# Patient Record
Sex: FEMALE | Race: BLACK OR AFRICAN AMERICAN | HISPANIC OR LATINO | Employment: PART TIME | URBAN - METROPOLITAN AREA
[De-identification: names, ages, dates, MRNs, and addresses within clinical notes are randomized per-mention and may not be internally consistent; named-entity substitution may affect disease eponyms.]

---

## 2024-07-16 ENCOUNTER — OFFICE VISIT (OUTPATIENT)
Dept: OBSTETRICS AND GYNECOLOGY | Facility: CLINIC | Age: 29
End: 2024-07-16
Payer: MEDICAID

## 2024-07-16 VITALS
WEIGHT: 141.6 LBS | BODY MASS INDEX: 27.8 KG/M2 | DIASTOLIC BLOOD PRESSURE: 75 MMHG | SYSTOLIC BLOOD PRESSURE: 109 MMHG | HEIGHT: 60 IN

## 2024-07-16 DIAGNOSIS — Z13.29 SCREENING FOR THYROID DISORDER: ICD-10-CM

## 2024-07-16 DIAGNOSIS — N89.8 VAGINAL DISCHARGE: ICD-10-CM

## 2024-07-16 DIAGNOSIS — Z13.1 SCREENING FOR DIABETES MELLITUS: ICD-10-CM

## 2024-07-16 DIAGNOSIS — Z01.411 ENCOUNTER FOR GYNECOLOGICAL EXAMINATION WITH ABNORMAL FINDING: Primary | ICD-10-CM

## 2024-07-16 DIAGNOSIS — Z11.3 SCREEN FOR SEXUALLY TRANSMITTED DISEASES: ICD-10-CM

## 2024-07-16 DIAGNOSIS — N92.6 ABNORMAL MENSES: ICD-10-CM

## 2024-07-16 DIAGNOSIS — Z12.4 SCREENING FOR MALIGNANT NEOPLASM OF CERVIX: ICD-10-CM

## 2024-07-16 NOTE — PROGRESS NOTES
Chief Complaint  Gynecologic Exam (New gyn , annual exam. Irregular periods )    Entirety of today's encounter including patient interview, exam, and counseling performed with the aid of a medical  via the telephone.     Subjective        Jessica Solano presents to [facility]  History of Present Illness    Patient is here for annual exam and also having concerns about irregular menstrual cycles.  She reports menses are unpredictable.  She can go a few months without having a menstrual cycle.  She then may have prolonged bleeding.  She is interested in trying to conceive in the near future.  She is having concerns with a vaginal discharge.    History reviewed. No pertinent past medical history.    History reviewed. No pertinent surgical history.    Social History     Tobacco Use    Smoking status: Never   Substance Use Topics    Alcohol use: Not Currently    Drug use: Never     Family History   Problem Relation Age of Onset    Breast cancer Neg Hx     Ovarian cancer Neg Hx     Uterine cancer Neg Hx     Colon cancer Neg Hx      No current outpatient medications on file prior to visit.     No current facility-administered medications on file prior to visit.     No Known Allergies    Review of systems:  Constitutional: No fevers, chills, sweats   Eye: No recent visual problems, denies blurry vision   HEENT: No ear pain, nasal congestion, sore throat, voice changes   Respiratory: No shortness of breath, cough, pain on breathing   Cardiovascular: No Chest pain, palpitations   Gastrointestinal: No nausea, vomiting, diarrhea, constipation   Genitourinary: No hematuria, dysuria, lesions on genitalia   Hema/Lymph: Negative for bruising, no edema   Endocrine: Negative for excessive thirst, excessive hunger, heat or cold intolerance   Musculoskeletal: No joint pain, muscle pain, decreased range of motion   Integumentary: No rash, pruritus, abrasions, lesions   Neurologic: No  "weakness, numbness, headaches   Psychiatric: No anxiety, depression, mood changes       Objective   Vital Signs:  /75   Ht 153 cm (60.24\")   Wt 64.2 kg (141 lb 9.6 oz)   BMI 27.44 kg/m²   Estimated body mass index is 27.44 kg/m² as calculated from the following:    Height as of this encounter: 153 cm (60.24\").    Weight as of this encounter: 64.2 kg (141 lb 9.6 oz).             Physical Exam   Exam performed in the presence of a female chaperone  Patient has provided verbal consent to proceed with exam.    Gen: No acute distress, awake and oriented times three  HENT: Normocephalic, atraumatic, Moist mucous membranes  Eyes: PERRLA, EOMI  Lungs: Normal work of breathing, lungs clear bilaterally  Breast: Symmetrical. No skin changes or nipple retractions. No lumps or masses bilaterally. No tenderness bilaterally.  Abdomen: soft, nontender, no masses or hernia, non distended, normoactive bowel sounds  Normal external female genitalia, no lesions  Urethra: Normal meatus, no caruncle  Bladder: nontender  Vagina: No blood or discharge  Cervix: No cervical motion tenderness, no lesions, no active bleeding, nonfriable  Uterus: Anteverted, normal size and shape, nontender  Adnexa: No masses or tenderness  External anal exam: Normal appearance, no lesions or hemorrhoids  Rectal: Deferred  Skin: Warm and dry, no rashes  Psych: Good judgement and insight, normal affect and mood  Neuro: CN 2-12 intact, no gross deficits    Result Review :                     Assessment and Plan     Diagnoses and all orders for this visit:    1. Encounter for gynecological examination with abnormal finding (Primary)    Pap - Ordered today.  STD screening - Cultures performed today.   Contraception - Options discussed with pt at length. Risks, benefits, side effects, and alternatives to various forms of hormonal, nonhormonal and barrier methods discussed. Pt elects declines.   Safe sex practices encouraged with patient.  Breast cancer " screening. Patient encouraged to perform routine self breast exams. Recommend yearly clinical breast exam and mammogram starting at age 40.  Recommend pt take calcium and vitamin D supplementation as well as prenatal vitamin or folic acid if she is attempting to conceive.  Encourage aerobic exercise with at least 30 minutes 5 days/wk.  Avoid excessive alcohol use.  Patient is advised to call the office for results after 1 week if she has not seen or heard the results of any tests ordered or performed today.      2. Screening for malignant neoplasm of cervix  -     IGP,CtNgTv,rfx Apt HPV All    3. Screen for sexually transmitted diseases  -     IGP,CtNgTv,rfx Apt HPV All    4. Vaginal discharge  -     NuSwab BV & Candida - Swab, Vagina  -     NuSwab Vaginitis (VG) - Swab, Vagina    Cultures obtained.  Will treat as indicated based on culture results.    5. Abnormal menses  -     Testosterone  -     Testosterone Free Direct  -     Prolactin  -     Follicle Stimulating Hormone  -     Luteinizing Hormone  -     Antimullerian Hormone (AMH)  -     Hemoglobin A1c  -     TSH Rfx On Abnormal To Free T4  -     CBC (No Diff)  -     HCG, B-subunit, Quantitative  -     US Non-ob Transvaginal; Future    Discussed potential causes of the menstrual irregularities that the patient is experiencing.  Recommend labs and ultrasound as above.  Will see her back after the ultrasound to discuss potential causes and management plan going forward.    6. Screening for diabetes mellitus  -     Hemoglobin A1c    7. Screening for thyroid disorder  -     TSH Rfx On Abnormal To Free T4             Follow Up     Return GYN US and GYn FU 2-5 weeks, for Needs to do blood work today.  Patient was given instructions and counseling regarding her condition or for health maintenance advice. Please see specific information pulled into the AVS if appropriate.

## 2024-07-17 LAB
ERYTHROCYTE [DISTWIDTH] IN BLOOD BY AUTOMATED COUNT: 12.2 % (ref 11.7–15.4)
FSH SERPL-ACNC: 3.1 MIU/ML
HBA1C MFR BLD: 5.5 % (ref 4.8–5.6)
HCG INTACT+B SERPL-ACNC: <1 MIU/ML
HCT VFR BLD AUTO: 36.9 % (ref 34–46.6)
HGB BLD-MCNC: 12 G/DL (ref 11.1–15.9)
LH SERPL-ACNC: 21.2 MIU/ML
MCH RBC QN AUTO: 28.8 PG (ref 26.6–33)
MCHC RBC AUTO-ENTMCNC: 32.5 G/DL (ref 31.5–35.7)
MCV RBC AUTO: 89 FL (ref 79–97)
PLATELET # BLD AUTO: 332 X10E3/UL (ref 150–450)
PROLACTIN SERPL-MCNC: 14 NG/ML (ref 4.8–33.4)
RBC # BLD AUTO: 4.16 X10E6/UL (ref 3.77–5.28)
TESTOST SERPL-MCNC: 33 NG/DL (ref 13–71)
TSH SERPL DL<=0.005 MIU/L-ACNC: 2.21 UIU/ML (ref 0.45–4.5)
WBC # BLD AUTO: 8.6 X10E3/UL (ref 3.4–10.8)

## 2024-07-18 LAB
A VAGINAE DNA VAG QL NAA+PROBE: ABNORMAL SCORE
BVAB2 DNA VAG QL NAA+PROBE: ABNORMAL SCORE
C ALBICANS DNA VAG QL NAA+PROBE: NEGATIVE
C GLABRATA DNA VAG QL NAA+PROBE: NEGATIVE
MEGA1 DNA VAG QL NAA+PROBE: ABNORMAL SCORE
T VAGINALIS DNA VAG QL NAA+PROBE: NEGATIVE
TESTOST FREE SERPL-MCNC: 1.1 PG/ML (ref 0–4.2)

## 2024-07-19 LAB
C TRACH RRNA CVX QL NAA+PROBE: NEGATIVE
CONV .: NORMAL
CYTOLOGIST CVX/VAG CYTO: NORMAL
CYTOLOGY CVX/VAG DOC CYTO: NORMAL
CYTOLOGY CVX/VAG DOC THIN PREP: NORMAL
DX ICD CODE: NORMAL
Lab: NORMAL
MIS SERPL-MCNC: 9.08 NG/ML
N GONORRHOEA RRNA CVX QL NAA+PROBE: NEGATIVE
OTHER STN SPEC: NORMAL
STAT OF ADQ CVX/VAG CYTO-IMP: NORMAL
T VAGINALIS RRNA SPEC QL NAA+PROBE: NEGATIVE

## 2024-07-22 ENCOUNTER — TELEPHONE (OUTPATIENT)
Dept: OBSTETRICS AND GYNECOLOGY | Facility: CLINIC | Age: 29
End: 2024-07-22
Payer: MEDICAID

## 2024-07-22 RX ORDER — METRONIDAZOLE 500 MG/1
500 TABLET ORAL 2 TIMES DAILY
Qty: 14 TABLET | Refills: 0 | Status: SHIPPED | OUTPATIENT
Start: 2024-07-22 | End: 2024-07-29

## 2024-07-22 NOTE — TELEPHONE ENCOUNTER
----- Message from Basilio Gregg sent at 7/22/2024 11:32 AM EDT -----  Let the patient know that her cultures revealed bacterial vaginosis.  I sent in a prescription for metronidazole.  She should avoid alcohol while taking this medication.

## 2024-07-24 ENCOUNTER — PATIENT ROUNDING (BHMG ONLY) (OUTPATIENT)
Dept: OBSTETRICS AND GYNECOLOGY | Facility: CLINIC | Age: 29
End: 2024-07-24
Payer: MEDICAID

## 2024-07-24 ENCOUNTER — PATIENT MESSAGE (OUTPATIENT)
Dept: OBSTETRICS AND GYNECOLOGY | Facility: CLINIC | Age: 29
End: 2024-07-24
Payer: MEDICAID

## 2024-07-24 NOTE — PROGRESS NOTES
My chart message has been sent to the patient for PATIENT ROUNDING with [facility] [place].

## 2024-08-15 ENCOUNTER — TELEPHONE (OUTPATIENT)
Dept: OBSTETRICS AND GYNECOLOGY | Facility: CLINIC | Age: 29
End: 2024-08-15
Payer: MEDICAID

## 2024-09-12 ENCOUNTER — OFFICE VISIT (OUTPATIENT)
Dept: OBSTETRICS AND GYNECOLOGY | Facility: CLINIC | Age: 29
End: 2024-09-12
Payer: MEDICAID

## 2024-09-12 VITALS
SYSTOLIC BLOOD PRESSURE: 120 MMHG | HEART RATE: 70 BPM | WEIGHT: 136 LBS | DIASTOLIC BLOOD PRESSURE: 85 MMHG | BODY MASS INDEX: 26.7 KG/M2 | HEIGHT: 60 IN

## 2024-09-12 DIAGNOSIS — E28.2 PCOS (POLYCYSTIC OVARIAN SYNDROME): ICD-10-CM

## 2024-09-12 DIAGNOSIS — N91.4 SECONDARY OLIGOMENORRHEA: Primary | ICD-10-CM

## 2024-09-12 RX ORDER — MEDROXYPROGESTERONE ACETATE 5 MG
5 TABLET ORAL DAILY
Qty: 7 TABLET | Refills: 0 | Status: SHIPPED | OUTPATIENT
Start: 2024-09-12 | End: 2024-09-19

## 2024-09-12 RX ORDER — NORGESTIMATE AND ETHINYL ESTRADIOL 0.25-0.035
1 KIT ORAL DAILY
Qty: 28 TABLET | Refills: 3 | Status: SHIPPED | OUTPATIENT
Start: 2024-09-12

## 2024-09-12 NOTE — PROGRESS NOTES
"Chief Complaint  Follow-up (Pt here for f/u on US.) here for follow-up of irregular uterine bleeding    Entirety of today's encounter including patient interview, exam, and counseling performed with the aid of a medical  via the telephone.     Subjective        Jessica Solano presents to [facility]  History of Present Illness  Patient is here for follow-up/management of irregular menstrual cycles.  She had lab work performed at the time of the last visit.  Please see the results below.  She had an ultrasound performed today.  Please see results below.    At the time of the last visit, she was having issues with vaginal discharge and was diagnosed with BV.  She was treated with medication at that time.  The discharge has resolved.  She reports that she is doing much better.    Patient's last menstrual period was 08/02/2024 (exact date).    Today we discussed patient's desires for conceiving in the near future.  She says she does not desire to conceive at this time.  She does potentially have desires for pregnancy in the distant future.    Patient reports that she has not been sexually active at all in the last 4 months, and certainly not since the time of her last period.    The following portions of the patient's history were reviewed and updated as appropriate: allergies, current medications, past family history, past medical history, past social history, past surgical history, and problem list.      Objective   Vital Signs:  /85   Pulse 70   Ht 153 cm (60.24\")   Wt 61.7 kg (136 lb)   BMI 26.35 kg/m²   Estimated body mass index is 26.35 kg/m² as calculated from the following:    Height as of this encounter: 153 cm (60.24\").    Weight as of this encounter: 61.7 kg (136 lb).          Physical Exam   General: No acute distress, awake and oriented x3  Psychiatric: good judgment and insight, normal mood  Neurological: cranial nerves II through XII intact, " no deficits    Result Review :             No visits with results within 45 Day(s) from this visit.   Latest known visit with results is:   Office Visit on 07/16/2024   Component Date Value Ref Range Status    Diagnosis 07/16/2024 Comment   Final    NEGATIVE FOR INTRAEPITHELIAL LESION OR MALIGNANCY.    Specimen adequacy: 07/16/2024 Comment   Final    Comment: Satisfactory for evaluation.  Endocervical and/or squamous metaplastic  cells (endocervical component) are present.      Clinician Provided ICD-10: 07/16/2024 Comment   Final    Comment: Z12.4  Z11.3      Performed by: 07/16/2024 Comment   Final    Tanya Bhatt, Cytotechnologist (ASCP)    . 07/16/2024 .   Final    Note: 07/16/2024 Comment   Final    Comment: The Pap smear is a screening test designed to aid in the detection of  premalignant and malignant conditions of the uterine cervix.  It is not a  diagnostic procedure and should not be used as the sole means of detecting  cervical cancer.  Both false-positive and false-negative reports do occur.      Method: 07/16/2024 Comment   Final    Comment: This liquid based ThinPrep(R) pap test was screened with the  use of an image guided system.      Conv .conv 07/16/2024 Comment   Final    Comment: The HPV DNA reflex criteria were not met with this specimen result  therefore, no HPV testing was performed.      Chlamydia, Nuc. Acid Amp 07/16/2024 Negative  Negative Final    Gonococcus by Nucleic Acid Amp 07/16/2024 Negative  Negative Final    Trichomonas vaginosis 07/16/2024 Negative  Negative Final    Testosterone, Total 07/16/2024 33  13 - 71 ng/dL Final    Testosterone, Free 07/16/2024 1.1  0.0 - 4.2 pg/mL Final    Prolactin 07/16/2024 14.0  4.8 - 33.4 ng/mL Final    FSH 07/16/2024 3.1  mIU/mL Final    Comment:                      Adult Female             Range                        Follicular phase      3.5 -  12.5                        Ovulation phase       4.7 -  21.5                        Luteal phase           1.7 -   7.7                        Postmenopausal       25.8 - 134.8      LH 07/16/2024 21.2  mIU/mL Final    Comment:                      Adult Female              Range                        Follicular phase      2.4 -  12.6                        Ovulation phase      14.0 -  95.6                        Luteal phase          1.0 -  11.4                        Postmenopausal        7.7 -  58.5      Anti-Mullerian Hormone (AMH) 07/16/2024 9.08  ng/mL Final    Comment: For assays employing antibodies, the possibility exists for  interference by heterophile antibodies in the samples.1  1.Murali PEDRAZA  Interferences in Immunoassays - still a threat.   Clin. Chem. 2000; 46: 9556-9790.  This test was developed and its performance characteristics  determined by [employer]. It has not been cleared or approved  by the Food and Drug Administration.  Reference Range:  Females 26 - 30y: 1.03 - 11.10  Median 4.20  AMH concentrations of >= 1.06 ng/mL is correlated with a  better response to ovarian stimulation, produced more  retrievable oocytes and higher odds of live birth according  to Jae et al.  Fertility and Sterility. 2010:  94:8614-8364.  The current AMH test method correlates with  the study method with a slope of 0.94.  Females at risk of ovarian hyperstimulation syndrome or  polycystic ovarian syndrome (PCOS) may exhibit elevated  serum AMH concentrations.   AMH levels from PCOS patients  may be 2 to 5 fold higher than age-appropriate reference  interv                           al values.  Granulosa cell tumors of the ovary may secrete AMH along  with other tumor markers.  Elevated AMH is not specific for  malignancy, and the assay should not be used exclusively to  diagnose or exclude an AMH-secreting ovarian tumor.      Hemoglobin A1C 07/16/2024 5.5  4.8 - 5.6 % Final    Comment:          Prediabetes: 5.7 - 6.4           Diabetes: >6.4           Glycemic control for adults with diabetes: <7.0      TSH  07/16/2024 2.210  0.450 - 4.500 uIU/mL Final    WBC 07/16/2024 8.6  3.4 - 10.8 x10E3/uL Final    RBC 07/16/2024 4.16  3.77 - 5.28 x10E6/uL Final    Hemoglobin 07/16/2024 12.0  11.1 - 15.9 g/dL Final    Hematocrit 07/16/2024 36.9  34.0 - 46.6 % Final    MCV 07/16/2024 89  79 - 97 fL Final    MCH 07/16/2024 28.8  26.6 - 33.0 pg Final    MCHC 07/16/2024 32.5  31.5 - 35.7 g/dL Final    RDW 07/16/2024 12.2  11.7 - 15.4 % Final    Platelets 07/16/2024 332  150 - 450 x10E3/uL Final    HCG Quantitative 07/16/2024 <1  mIU/mL Final    Comment:                      Female (Non-pregnant)    0 -     5                              (Postmenopausal)  0 -     8                       Female (Pregnant)                       Weeks of Gestation                               3                6 -    71                               4               10 -   750                               5              217 -  7138                               6              158 - 87853                               7             [id number] -[id number]                               8            [id number] -[id number]                               9            [id number] -[id number]                              10            64815 -687703                              12            [id number] -[id number]                              14            17213 - 06534                              15            36516 - 56158                              16             1770 - 80101                              17             6166 - 09829                              18             7964 - 85877  Roche E                           CLIA methodology      Atopobium Vaginae 07/16/2024 Moderate - 1  Score Final    BVAB 2 07/16/2024 High - 2 (A)  Score Final    Megasphaera 1 07/16/2024 High - 2 (A)  Score Final    Comment: Calculate total score by adding the 3 individual bacterial  vaginosis (BV) marker scores together.  Total score is  interpreted as follows:  Total score 0-1: Indicates the absence of  BV.  Total score   2: Indeterminate for BV. Additional clinical                   data should be evaluated to establish a                   diagnosis.  Total score 3-6: Indicates the presence of BV.      Shraddha Albicans, THEA 07/16/2024 Negative  Negative Final    Shraddha Glabrata, THEA 07/16/2024 Negative  Negative Final    Trichomonas vaginosis 07/16/2024 Negative  Negative Final     Ultrasound today:  Findings:  Uterus measures 8.00 x 4.12 x 3.74 cm, volume 64.544 cm cube  .  Endometrial thickness measures 0.57 cm, and is homogenous appearance without evidence of polyps.  Cervix is normal-appearing.  Fibroid 1: 1.3 x 1.2 cm, appears lateral and intramural  Fibroid 2: 0.9 x 0.7 cm, appears posterior and subserosal  Left ovary: 3.28 x 2.28 x 2.12 cm, volume 8.301 cm cube  Ovary appears polycystic in nature with greater than 10 follicles noted.  There is no adnexal mass identified.    Right ovary: 3.81 x 2.76 x 2.73 cm, volume 15.031 cm cube  Ovary appears polycystic in nature with greater than 10 follicles and volume greater than 10 cm cube.  There is no adnexal mass identified.    There is no free fluid.    Impression:    Polycystic appearance to both ovaries noted.  2 small uterine fibroid seen.  Otherwise normal pelvic ultrasound       Assessment and Plan   Diagnoses and all orders for this visit:    1. Secondary oligomenorrhea (Primary)    2. PCOS (polycystic ovarian syndrome)  -     medroxyPROGESTERone (Provera) 5 MG tablet; Take 1 tablet by mouth Daily for 7 days.  Dispense: 7 tablet; Refill: 0  -     norgestimate-ethinyl estradiol (Sprintec 28) 0.25-35 MG-MCG per tablet; Take 1 tablet by mouth Daily.  Dispense: 28 tablet; Refill: 3      Patient's symptoms most likely secondary to polycystic ovarian syndrome (PCOS). Ultrasound findings today also consistent with PCOS.  Recent labs also consistent with PCOS given elevated LH: FSH ratio and elevated AMH. Discussed syndrome with pt at length. Discussed potential  effects on fertility and pregnancy. Discussed effects on menses including irregular menses and heavy bleeding. Discussed potential long-term health risks such as endometrial hyperplasia/malignancy, risks of type 2 diabetes, and heart disease. The effects of obesity on the disease process were explained, and the benefits of diet/exercise/weight loss discussed.   Discussed therapeutic options to help with irregular bleeding. Discussed use of hormonal medication for menstrual regulation as well as prevention of unopposed estrogen effect on endometrium. Discussed hormonal contraception versus cyclic provera.     Pt with no current desires for pregnancy at this time. Pt opts for oral contraceptive pills. Risks, benefits, alternatives, side effects and instructions for use discussed.  Instructions for use given.  Since she has not had a menstrual cycle since 8/2 would recommend a dose of Provera to initiate a withdrawal bleed, then followed by initiation of combination oral contraceptive pills.    The patient states she has not been sexually active at all in the last 4 months, thus excluding pregnancy at this time.    I would like to see her back in about 3 months to see how she is doing on the birth control pills.         Follow Up   Return in about 3 months (around 12/12/2024).  Patient was given instructions and counseling regarding her condition or for health maintenance advice. Please see specific information pulled into the AVS if appropriate.

## 2024-12-05 ENCOUNTER — TELEPHONE (OUTPATIENT)
Dept: OBSTETRICS AND GYNECOLOGY | Facility: CLINIC | Age: 29
End: 2024-12-05
Payer: MEDICAID

## 2024-12-05 NOTE — TELEPHONE ENCOUNTER
Called pt via  about ns/ on 12/3/24, pt had to work and will be out of insurance coverage until some time in February, she will call back to r/sbonilla

## 2025-01-30 ENCOUNTER — TELEPHONE (OUTPATIENT)
Dept: OBSTETRICS AND GYNECOLOGY | Facility: CLINIC | Age: 30
End: 2025-01-30
Payer: MEDICAID

## 2025-03-11 ENCOUNTER — OFFICE VISIT (OUTPATIENT)
Dept: OBSTETRICS AND GYNECOLOGY | Facility: CLINIC | Age: 30
End: 2025-03-11
Payer: MEDICAID

## 2025-03-11 VITALS
DIASTOLIC BLOOD PRESSURE: 68 MMHG | WEIGHT: 144.6 LBS | HEIGHT: 60 IN | SYSTOLIC BLOOD PRESSURE: 102 MMHG | HEART RATE: 86 BPM | BODY MASS INDEX: 28.39 KG/M2

## 2025-03-11 DIAGNOSIS — N76.0 RECURRENT VAGINITIS: ICD-10-CM

## 2025-03-11 DIAGNOSIS — E28.2 PCOS (POLYCYSTIC OVARIAN SYNDROME): Primary | ICD-10-CM

## 2025-03-11 NOTE — PROGRESS NOTES
"Chief Complaint  Follow-up (Pt here for birth control f/u from appointment 9/12/2024. Pt can not recall last menstrual cycle but states it was in the last month.)    Entirety of today's encounter including patient interview, exam, and counseling performed with the aid of a medical  via the telephone.     Subjective        Jessica Solano presents to [facility]  History of Present Illness  Patient likely with PCOS based on ultrasound and lab findings.  Patient was prescribed birth control pills in September 2024 for cycle regulation.  She was supposed to follow-up 3 months following to reassess; however, she has no showed 2 appointments since then.  At this time, she reports that her menstrual cycles have become more regular naturally.  She says she never started taking the birth control pills.  Patient had also been seen for bacterial vaginosis in the past.  She says she has started a daily probiotic supplement and this has been very helpful for her.  She would like to know if she can continue this.    Objective   Vital Signs:  /68   Pulse 86   Ht 153 cm (60.24\")   Wt 65.6 kg (144 lb 9.6 oz)   BMI 28.02 kg/m²   Estimated body mass index is 28.02 kg/m² as calculated from the following:    Height as of this encounter: 153 cm (60.24\").    Weight as of this encounter: 65.6 kg (144 lb 9.6 oz).          Physical Exam   General: No acute distress, awake and oriented x3  Psychiatric: good judgment and insight, normal mood  Neurological: cranial nerves II through XII intact, no deficits    Result Review :                Assessment and Plan   Diagnoses and all orders for this visit:    1. PCOS (polycystic ovarian syndrome) (Primary)    Patient reports improvement in her menstrual cycles spontaneously.  She does not wish to be on birth control at this time.  I advised that if her cycles become more irregular she can come back to see us for further evaluation.  " Otherwise I will see her after 7/14/2025 for annual    2. Recurrent vaginitis    Patient reports improvement in her symptoms with use of a daily probiotic.  Encouraged to continue this.         Follow Up   Return in about 5 months (around 8/11/2025), or annual.  Patient was given instructions and counseling regarding her condition or for health maintenance advice. Please see specific information pulled into the AVS if appropriate.

## 2025-07-22 ENCOUNTER — OFFICE VISIT (OUTPATIENT)
Dept: OBSTETRICS AND GYNECOLOGY | Facility: CLINIC | Age: 30
End: 2025-07-22
Payer: MEDICAID

## 2025-07-22 VITALS
BODY MASS INDEX: 28.23 KG/M2 | HEIGHT: 60 IN | DIASTOLIC BLOOD PRESSURE: 72 MMHG | WEIGHT: 143.8 LBS | HEART RATE: 98 BPM | SYSTOLIC BLOOD PRESSURE: 106 MMHG

## 2025-07-22 DIAGNOSIS — Z30.09 FAMILY PLANNING ADVICE: ICD-10-CM

## 2025-07-22 DIAGNOSIS — Z01.411 ENCOUNTER FOR GYNECOLOGICAL EXAMINATION WITH ABNORMAL FINDING: Primary | ICD-10-CM

## 2025-07-22 DIAGNOSIS — Z11.3 SCREEN FOR SEXUALLY TRANSMITTED DISEASES: ICD-10-CM

## 2025-07-22 DIAGNOSIS — E28.2 PCOS (POLYCYSTIC OVARIAN SYNDROME): ICD-10-CM

## 2025-07-22 DIAGNOSIS — Z30.011 ENCOUNTER FOR INITIAL PRESCRIPTION OF CONTRACEPTIVE PILLS: ICD-10-CM

## 2025-07-22 RX ORDER — NORGESTIMATE AND ETHINYL ESTRADIOL 0.25-0.035
1 KIT ORAL DAILY
Qty: 84 TABLET | Refills: 3 | Status: SHIPPED | OUTPATIENT
Start: 2025-07-22

## 2025-07-22 NOTE — PROGRESS NOTES
"Chief Complaint  Annual Exam (Pt here for annual exam. Last pap 7/16/2024 was normal. Pt would like to discuss  birth control options. Plans to get pregnant next year and would like to discuss how best to prepare for that as well.)    Entirety of today's encounter including patient interview, exam, and counseling performed with the aid of a medical  via the telephone.       Subjective        Jessica Solano presents to [facility]  History of Present Illness  Patient is here for annual exam.  She also like to discuss starting on birth control.  She plans to try to conceive in about 1 year.  She has a history of suspected PCOS.  She reports that recently her menstrual cycles have been very regular.    Patient's last menstrual period was 06/25/2025 (exact date).      The following portions of the patient's history were reviewed and updated as appropriate: allergies, current medications, past family history, past medical history, past social history, past surgical history, and problem list.    Objective   Vital Signs:  /72   Pulse 98   Ht 153 cm (60.24\")   Wt 65.2 kg (143 lb 12.8 oz)   BMI 27.86 kg/m²   Estimated body mass index is 27.86 kg/m² as calculated from the following:    Height as of this encounter: 153 cm (60.24\").    Weight as of this encounter: 65.2 kg (143 lb 12.8 oz).          Physical Exam   Exam performed in the presence of a female chaperone  Patient has provided verbal consent to proceed with exam.    Gen: No acute distress, awake and oriented times three  HENT: Normocephalic, atraumatic, Moist mucous membranes  Eyes: PERRLA, EOMI  Lungs: Normal work of breathing, lungs clear bilaterally  Breast: Symmetrical. No skin changes or nipple retractions. No lumps or masses bilaterally. No tenderness bilaterally.  Skin: Warm and dry, no rashes  Psych: Good judgement and insight, normal affect and mood  Neuro: CN 2-12 intact, no gross " deficits    Result Review :                Assessment and Plan   Diagnoses and all orders for this visit:    1. Encounter for gynecological examination with abnormal finding (Primary)    Pap -up-to-date  STD screening - Cultures performed today. Labs offered to pt and patient accepts.  Contraception - Options discussed with pt at length. Risks, benefits, side effects, and alternatives to various forms of hormonal, nonhormonal and barrier methods discussed. Pt elects oral contraceptive pills.   Safe sex practices encouraged with patient.  Breast cancer screening. Patient encouraged to perform routine self breast exams. Recommend yearly clinical breast exam and mammogram starting at age 40.  Recommend pt take calcium and vitamin D supplementation as well as prenatal vitamin or folic acid if she is attempting to conceive.  Encourage aerobic exercise with at least 30 minutes 5 days/wk.  Avoid excessive alcohol use.  Patient is advised to call the office for results after 1 week if she has not seen or heard the results of any tests ordered or performed today.    2. PCOS (polycystic ovarian syndrome) -improved  Patient reports that recently her menstrual cycles have been regular.  No further management is necessary for this at this time.    3. Family planning advice  Patient plans to try to conceive in about 1 year.  Advise initiate prenatal vitamin about 3 months before starting to conceive.  When she is ready to start trying she may stop her birth control pill.  She verbalized understanding.    4. Screen for sexually transmitted diseases  -     HIV-1 / O / 2 Ag / Antibody  -     Hepatitis B Surface Antigen  -     HCV Antibody Rfx To Qnt PCR  -     RPR, Rfx Qn RPR / Confirm TP  -     Chlamydia trachomatis, Neisseria gonorrhoeae, Trichomonas vaginalis, PCR - Urine, Urine, Clean Catch    5. Encounter for initial prescription of contraceptive pills  -     norgestimate-ethinyl estradiol (Sprintec 28) 0.25-35 MG-MCG per  tablet; Take 1 tablet by mouth Daily.  Dispense: 84 tablet; Refill: 3    Various contraceptive options discussed including combined oral contraceptives, contraceptive patch, NuvaRing, progesterone only contraceptive, Depo-Provera, Nexplanon, progesterone IUD, copper IUD, and barrier methods. Paitent elects for oral contraceptive pills. Risks, benefits, alternatives discussed at length. Risks of venous thromboembolic complications discussed. Instructions for use and Sunday start discussed.          Follow Up   Return in about 1 year (around 7/22/2026).  Patient was given instructions and counseling regarding her condition or for health maintenance advice. Please see specific information pulled into the AVS if appropriate.

## 2025-07-23 LAB
HBV SURFACE AG SERPL QL IA: NEGATIVE
HCV AB SERPL QL IA: NON REACTIVE
HCV AB SERPL QL IA: NORMAL
HIV 1+2 AB+HIV1 P24 AG SERPL QL IA: NON REACTIVE
RPR SER QL: NON REACTIVE

## 2025-07-25 LAB
C TRACH RRNA SPEC QL NAA+PROBE: NEGATIVE
N GONORRHOEA RRNA SPEC QL NAA+PROBE: NEGATIVE
T VAGINALIS RRNA SPEC QL NAA+PROBE: NEGATIVE